# Patient Record
Sex: FEMALE | URBAN - METROPOLITAN AREA
[De-identification: names, ages, dates, MRNs, and addresses within clinical notes are randomized per-mention and may not be internally consistent; named-entity substitution may affect disease eponyms.]

---

## 2022-03-09 ENCOUNTER — TELEPHONE (OUTPATIENT)
Dept: ADMINISTRATIVE | Facility: CLINIC | Age: 42
End: 2022-03-09
Payer: COMMERCIAL

## 2022-03-09 NOTE — TELEPHONE ENCOUNTER
Nutrition Education Scheduling Outreach #1:    Call to patient to schedule. Left message with phone number to call to schedule.    Plan for 2nd outreach attempt within 1 week.    Kari Arteaga  [facility]  Diabetes and Nutrition Scheduling

## 2022-03-21 NOTE — TELEPHONE ENCOUNTER
Nutrition Education Scheduling Outreach #2:    Call to patient to schedule. Left message with phone number to call to schedule.    Kari Arteaga  [facility]  Diabetes and Nutrition Scheduling